# Patient Record
Sex: MALE | Race: WHITE | NOT HISPANIC OR LATINO | Employment: OTHER | URBAN - METROPOLITAN AREA
[De-identification: names, ages, dates, MRNs, and addresses within clinical notes are randomized per-mention and may not be internally consistent; named-entity substitution may affect disease eponyms.]

---

## 2023-09-23 PROBLEM — I10 ESSENTIAL HYPERTENSION: Status: ACTIVE | Noted: 2023-09-23

## 2023-09-23 PROBLEM — R01.1 MURMUR, CARDIAC: Status: ACTIVE | Noted: 2023-09-23

## 2023-09-23 PROBLEM — K21.00 REFLUX ESOPHAGITIS: Status: ACTIVE | Noted: 2023-09-23

## 2023-09-23 PROBLEM — E03.9 HYPOTHYROIDISM: Status: ACTIVE | Noted: 2023-09-23

## 2023-09-23 PROBLEM — F32.A DEPRESSION: Status: ACTIVE | Noted: 2023-09-23

## 2023-09-23 PROBLEM — I25.10 CORONARY ARTERY DISEASE: Status: ACTIVE | Noted: 2023-09-23

## 2023-09-23 PROBLEM — E87.5 HYPERKALEMIA: Status: ACTIVE | Noted: 2023-09-23

## 2023-09-23 PROBLEM — M53.9 MULTILEVEL DEGENERATIVE DISC DISEASE: Status: ACTIVE | Noted: 2023-09-23

## 2023-09-23 PROBLEM — G47.00 INSOMNIA: Status: ACTIVE | Noted: 2023-09-23

## 2023-09-23 PROBLEM — I35.0 AORTIC STENOSIS: Status: ACTIVE | Noted: 2023-09-23

## 2023-09-23 PROBLEM — M10.9 GOUT: Status: ACTIVE | Noted: 2023-09-23

## 2023-09-23 PROBLEM — R68.89 ABNORMAL CLINICAL FINDING: Status: ACTIVE | Noted: 2023-09-23

## 2023-09-23 PROBLEM — K92.2 GI BLEEDING: Status: ACTIVE | Noted: 2023-09-23

## 2023-09-23 PROBLEM — B35.1 ONYCHOMYCOSIS: Status: ACTIVE | Noted: 2023-09-23

## 2023-09-23 PROBLEM — R93.89 ABNORMAL CHEST XRAY: Status: ACTIVE | Noted: 2023-09-23

## 2023-09-23 PROBLEM — K63.89 MELANOSIS COLI: Status: ACTIVE | Noted: 2023-09-23

## 2023-09-23 PROBLEM — D64.9 ANEMIA: Status: ACTIVE | Noted: 2023-09-23

## 2023-09-23 PROBLEM — M54.30 SCIATICA: Status: ACTIVE | Noted: 2023-09-23

## 2023-09-23 PROBLEM — E61.1 IRON DEFICIENCY: Status: ACTIVE | Noted: 2023-09-23

## 2023-09-23 PROBLEM — F41.9 ANXIETY: Status: ACTIVE | Noted: 2023-09-23

## 2023-09-23 PROBLEM — K57.90 DIVERTICULOSIS: Status: ACTIVE | Noted: 2023-09-23

## 2023-09-23 PROBLEM — R73.9 HYPERGLYCEMIA: Status: ACTIVE | Noted: 2023-09-23

## 2023-09-23 PROBLEM — R09.89 CHEST CRACKLES: Status: ACTIVE | Noted: 2023-09-23

## 2023-09-23 PROBLEM — Z95.1 HISTORY OF CORONARY ARTERY BYPASS SURGERY: Status: ACTIVE | Noted: 2023-09-23

## 2023-09-23 PROBLEM — N17.9 ACUTE RENAL FAILURE (CMS-HCC): Status: ACTIVE | Noted: 2023-09-23

## 2023-09-23 PROBLEM — J84.9 INTERSTITIAL LUNG DISEASE (MULTI): Status: ACTIVE | Noted: 2023-09-23

## 2023-09-23 PROBLEM — N52.9 DIFFICULTY ATTAINING ERECTION: Status: ACTIVE | Noted: 2023-09-23

## 2023-09-23 PROBLEM — E78.5 HYPERLIPIDEMIA: Status: ACTIVE | Noted: 2023-09-23

## 2023-09-23 PROBLEM — J84.10 PULMONARY FIBROSIS (MULTI): Status: ACTIVE | Noted: 2023-09-23

## 2023-09-23 PROBLEM — M15.9 OSTEOARTHRITIS, MULTIPLE SITES: Status: ACTIVE | Noted: 2023-09-23

## 2023-09-23 PROBLEM — R60.9 EDEMA: Status: ACTIVE | Noted: 2023-09-23

## 2023-09-23 PROBLEM — R93.5 ABNORMAL CT OF THE ABDOMEN: Status: ACTIVE | Noted: 2023-09-23

## 2023-09-23 RX ORDER — AMLODIPINE BESYLATE 5 MG/1
1 TABLET ORAL DAILY
COMMUNITY
Start: 2022-09-27 | End: 2024-05-24 | Stop reason: WASHOUT

## 2023-09-23 RX ORDER — LEVOTHYROXINE SODIUM 88 UG/1
1 TABLET ORAL DAILY
COMMUNITY
Start: 2022-08-18 | End: 2024-04-10 | Stop reason: SDUPTHER

## 2023-09-23 RX ORDER — OMEPRAZOLE 40 MG/1
1 CAPSULE, DELAYED RELEASE ORAL 2 TIMES DAILY
COMMUNITY
Start: 2020-09-09 | End: 2024-05-15

## 2023-09-23 RX ORDER — ASCORBIC ACID 500 MG
500 TABLET,CHEWABLE ORAL SEE ADMIN INSTRUCTIONS
COMMUNITY

## 2023-09-23 RX ORDER — DOCUSATE SODIUM 100 MG/1
1 CAPSULE, LIQUID FILLED ORAL 2 TIMES DAILY
COMMUNITY
End: 2024-05-24 | Stop reason: WASHOUT

## 2023-09-23 RX ORDER — FERROUS SULFATE 325(65) MG
1 TABLET ORAL DAILY
COMMUNITY
Start: 2022-08-18 | End: 2024-01-05 | Stop reason: SDUPTHER

## 2023-09-23 RX ORDER — METOPROLOL TARTRATE 50 MG/1
1 TABLET ORAL 2 TIMES DAILY
COMMUNITY
Start: 2021-03-09 | End: 2023-10-27

## 2023-09-23 RX ORDER — ASPIRIN 81 MG/1
1 TABLET ORAL DAILY
COMMUNITY
Start: 2022-10-19

## 2023-09-23 RX ORDER — SERTRALINE HYDROCHLORIDE 50 MG/1
1 TABLET, FILM COATED ORAL DAILY
COMMUNITY
Start: 2021-03-09 | End: 2024-02-13 | Stop reason: SDUPTHER

## 2023-09-23 RX ORDER — CHOLECALCIFEROL (VITAMIN D3) 125 MCG
1 CAPSULE ORAL DAILY
COMMUNITY

## 2023-09-23 RX ORDER — TRAZODONE HYDROCHLORIDE 50 MG/1
1 TABLET ORAL NIGHTLY PRN
COMMUNITY
Start: 2021-03-09 | End: 2023-11-02 | Stop reason: SDUPTHER

## 2023-09-23 RX ORDER — DOXAZOSIN 1 MG/1
1 TABLET ORAL DAILY
COMMUNITY
Start: 2021-05-02 | End: 2024-01-05

## 2023-09-23 RX ORDER — TALC
1-2 POWDER (GRAM) TOPICAL NIGHTLY PRN
COMMUNITY

## 2023-09-23 RX ORDER — ROSUVASTATIN CALCIUM 40 MG/1
1 TABLET, COATED ORAL DAILY
COMMUNITY
Start: 2021-03-09 | End: 2023-10-27

## 2023-10-24 ENCOUNTER — APPOINTMENT (OUTPATIENT)
Dept: PRIMARY CARE | Facility: CLINIC | Age: 83
End: 2023-10-24
Payer: MEDICARE

## 2023-10-24 DIAGNOSIS — E78.5 HYPERLIPIDEMIA, UNSPECIFIED HYPERLIPIDEMIA TYPE: ICD-10-CM

## 2023-10-25 DIAGNOSIS — I10 PRIMARY HYPERTENSION: ICD-10-CM

## 2023-10-26 RX ORDER — TADALAFIL 20 MG/1
20 TABLET ORAL DAILY PRN
COMMUNITY
Start: 2023-09-17 | End: 2024-05-24 | Stop reason: WASHOUT

## 2023-10-27 RX ORDER — METOPROLOL TARTRATE 50 MG/1
50 TABLET ORAL 2 TIMES DAILY
Qty: 180 TABLET | Refills: 0 | Status: SHIPPED | OUTPATIENT
Start: 2023-10-27 | End: 2024-01-29 | Stop reason: SDUPTHER

## 2023-10-27 RX ORDER — ROSUVASTATIN CALCIUM 40 MG/1
40 TABLET, COATED ORAL DAILY
Qty: 90 TABLET | Refills: 0 | Status: SHIPPED | OUTPATIENT
Start: 2023-10-27 | End: 2024-01-29 | Stop reason: SDUPTHER

## 2023-11-02 DIAGNOSIS — G47.00 INSOMNIA, UNSPECIFIED TYPE: ICD-10-CM

## 2023-11-02 RX ORDER — TRAZODONE HYDROCHLORIDE 50 MG/1
50 TABLET ORAL NIGHTLY PRN
Qty: 180 TABLET | Refills: 2 | Status: SHIPPED | OUTPATIENT
Start: 2023-11-02

## 2023-11-08 ENCOUNTER — APPOINTMENT (OUTPATIENT)
Dept: PRIMARY CARE | Facility: CLINIC | Age: 83
End: 2023-11-08
Payer: MEDICARE

## 2023-12-07 ENCOUNTER — APPOINTMENT (OUTPATIENT)
Dept: PRIMARY CARE | Facility: CLINIC | Age: 83
End: 2023-12-07
Payer: MEDICARE

## 2024-01-04 DIAGNOSIS — N40.0 BENIGN PROSTATIC HYPERPLASIA, UNSPECIFIED WHETHER LOWER URINARY TRACT SYMPTOMS PRESENT: Primary | ICD-10-CM

## 2024-01-05 ENCOUNTER — TELEMEDICINE (OUTPATIENT)
Dept: PRIMARY CARE | Facility: CLINIC | Age: 84
End: 2024-01-05
Payer: MEDICARE

## 2024-01-05 VITALS
SYSTOLIC BLOOD PRESSURE: 155 MMHG | TEMPERATURE: 96.7 F | OXYGEN SATURATION: 96 % | HEART RATE: 101 BPM | DIASTOLIC BLOOD PRESSURE: 85 MMHG

## 2024-01-05 DIAGNOSIS — D50.9 IRON DEFICIENCY ANEMIA, UNSPECIFIED IRON DEFICIENCY ANEMIA TYPE: Primary | ICD-10-CM

## 2024-01-05 DIAGNOSIS — J40 BRONCHITIS: ICD-10-CM

## 2024-01-05 DIAGNOSIS — I10 ESSENTIAL HYPERTENSION: ICD-10-CM

## 2024-01-05 PROBLEM — R01.1 MURMUR, CARDIAC: Status: RESOLVED | Noted: 2023-09-23 | Resolved: 2024-01-05

## 2024-01-05 PROCEDURE — 99442 PR PHYS/QHP TELEPHONE EVALUATION 11-20 MIN: CPT | Performed by: INTERNAL MEDICINE

## 2024-01-05 RX ORDER — DOXAZOSIN 1 MG/1
1 TABLET ORAL DAILY
Qty: 90 TABLET | Refills: 3 | Status: SHIPPED | OUTPATIENT
Start: 2024-01-05

## 2024-01-05 RX ORDER — FERROUS SULFATE 325(65) MG
1 TABLET ORAL
Qty: 90 TABLET | Refills: 3 | Status: SHIPPED | OUTPATIENT
Start: 2024-01-05

## 2024-01-05 RX ORDER — AZITHROMYCIN 500 MG/1
500 TABLET, FILM COATED ORAL DAILY
Qty: 7 TABLET | Refills: 0 | Status: SHIPPED | OUTPATIENT
Start: 2024-01-05 | End: 2024-01-12

## 2024-01-05 RX ORDER — BENZONATATE 200 MG/1
200 CAPSULE ORAL 3 TIMES DAILY PRN
Qty: 30 CAPSULE | Refills: 0 | Status: SHIPPED | OUTPATIENT
Start: 2024-01-05 | End: 2024-05-24 | Stop reason: WASHOUT

## 2024-01-05 RX ORDER — FLUTICASONE PROPIONATE 50 MCG
2 SPRAY, SUSPENSION (ML) NASAL DAILY
COMMUNITY
End: 2024-05-24 | Stop reason: WASHOUT

## 2024-01-05 RX ORDER — DOXAZOSIN 1 MG/1
1 TABLET ORAL NIGHTLY
Qty: 90 TABLET | Refills: 3 | Status: SHIPPED | OUTPATIENT
Start: 2024-01-05 | End: 2024-05-24 | Stop reason: SDUPTHER

## 2024-01-05 ASSESSMENT — PAIN SCALES - GENERAL: PAINLEVEL: 0-NO PAIN

## 2024-01-05 ASSESSMENT — PATIENT HEALTH QUESTIONNAIRE - PHQ9
2. FEELING DOWN, DEPRESSED OR HOPELESS: NOT AT ALL
SUM OF ALL RESPONSES TO PHQ9 QUESTIONS 1 & 2: 0
1. LITTLE INTEREST OR PLEASURE IN DOING THINGS: NOT AT ALL

## 2024-01-05 NOTE — PROGRESS NOTES
Chief Complaint   Patient presents with    Headache    Cough    Fatigue    Generalized Body Aches     Pt presents for telephone visit with c/o sinus pressure, nasal drainage, moist cough that is productive with yellowish-brown phlegm, headache and body aches.  Onset 1.5 weeks.         83 year old man   he became ill few days after Nohemy.   +contact with someone with recent flu-that person had been to [place].   Cough productive. No fever when checked temperature.   Sometimes felt like a fever.  No wheezing but had it before.   When he gets up in am will have wheeze and cough.   Did not have it this morning. Does not feel shortness of breath. No nausea, vomiting, or diarrhea.       PAST MEDICAL HISTORY:   1. CABG 2010.  2. Hypothyroidism, TSH 4/2023 normal  3. Hyperglycemia.  4. Anemia, admission 2014.  5. GERD, hiatal hernia, esophageal stricture, status post dilatation.  6. Gastric ulcer and duodenitis.  7. Hyperlipidemia.  8. Gout.  9. Diverticulosis.  10. Depression and anxiety.  11. Insomnia.  12. Erectile dysfunction.  13. Prior leukopenia.  14. Thrombocytopenia in 2016, previously evaluated by Dr. Moralez.  15. LEFT 3rd toe chronic ulcer. Dr Dominguez. Healed 2019  16. Cystitis, ARF, sepsis. possible bladder mass. 7/2022.     PHYSICAL EXAM:   speech intact        4/2023 K, cbc, cmp tsh  K 4.8 repat 4.8 (post kayexolate)   hg 12 hct 36.6 cr 2.4 Na 140        LABS 9 December 2022 A1c, CBC, iron, ferritin, CMP, lipid  A1c 5.1  White blood cell count 5.7 hemoglobin low 11.7 hematocrit 35.2 platelet 162 iron 70 ferritin 190 normal  Potassium 5.2 elevated. Creatinine 2.0 BUN 41 glucose elevated 109 liver function test negative  Cholesterol 111 HDL low 36 LDL 42 triglyceride 165     8/24/22 bmp  Na 141 K 4.7 Cr 2.2 Glucose 117 GFR 28     8/2022 A1c, BNP, liid, cmp, tsh, cbc  A1c 4.3 wbc 6.9 hg 10.2 hct 31.4  BNP elevated 493 (-100)  167/81/58/138  Na 141 Cr 2.2 K elevated 5.6-- corrected. GFR 29  TSH elevated 4.15      2/2022 lipid, bmp, ferritin, iron, tsh, cbc.   Cholesterol 116 LDL 53  Hemoglobin low 12.5 (history of anemia)  Creatinine 1.4 stable glucose elevated 115  Ferritin 87 iron 46  TSH elevated 6.93      9/2021 CMP, A1c, CBC  Na 137 K 4.9 Cr elevated 1.4 glucose 100 liver function tests negative  A1c 5.2  wbc 5.4 hg low 11.8 hct 35.8 platelet 181     Reviewed CT chest 2022 DB motion     Assessment and plan     Sherman was seen today for headache, cough, fatigue and generalized body aches.  Diagnoses and all orders for this visit:  Iron deficiency anemia, unspecified iron deficiency anemia type (Primary)  -     ferrous sulfate, 325 mg ferrous sulfate, tablet; Take 1 tablet by mouth once daily with breakfast. Take with otc vitamin c  Essential hypertension  -     doxazosin (Cardura) 1 mg tablet; Take 1 tablet (1 mg) by mouth once daily at bedtime.  Bronchitis  -     azithromycin (Zithromax) 500 mg tablet; Take 1 tablet (500 mg) by mouth once daily for 7 days.  -     benzonatate (Tessalon) 200 mg capsule; Take 1 capsule (200 mg) by mouth 3 times a day as needed for cough. Do not crush or chew.          colonoscopy 2015 Dr Naylor

## 2024-01-08 ENCOUNTER — APPOINTMENT (OUTPATIENT)
Dept: PRIMARY CARE | Facility: CLINIC | Age: 84
End: 2024-01-08
Payer: MEDICARE

## 2024-01-29 DIAGNOSIS — E78.5 HYPERLIPIDEMIA, UNSPECIFIED HYPERLIPIDEMIA TYPE: ICD-10-CM

## 2024-01-29 DIAGNOSIS — I10 PRIMARY HYPERTENSION: ICD-10-CM

## 2024-01-29 RX ORDER — ROSUVASTATIN CALCIUM 40 MG/1
40 TABLET, COATED ORAL DAILY
Qty: 90 TABLET | Refills: 0 | Status: SHIPPED | OUTPATIENT
Start: 2024-01-29 | End: 2024-04-29

## 2024-01-29 RX ORDER — METOPROLOL TARTRATE 50 MG/1
50 TABLET ORAL 2 TIMES DAILY
Qty: 180 TABLET | Refills: 0 | Status: SHIPPED | OUTPATIENT
Start: 2024-01-29 | End: 2024-04-29

## 2024-02-13 DIAGNOSIS — F41.9 ANXIETY: ICD-10-CM

## 2024-02-13 DIAGNOSIS — F32.A DEPRESSION, UNSPECIFIED DEPRESSION TYPE: ICD-10-CM

## 2024-02-13 RX ORDER — SERTRALINE HYDROCHLORIDE 50 MG/1
50 TABLET, FILM COATED ORAL DAILY
Qty: 90 TABLET | Refills: 1 | Status: SHIPPED | OUTPATIENT
Start: 2024-02-13

## 2024-04-10 DIAGNOSIS — E78.5 HYPERLIPIDEMIA, UNSPECIFIED HYPERLIPIDEMIA TYPE: ICD-10-CM

## 2024-04-10 DIAGNOSIS — E03.9 HYPOTHYROIDISM, UNSPECIFIED TYPE: ICD-10-CM

## 2024-04-10 DIAGNOSIS — I10 ESSENTIAL HYPERTENSION: ICD-10-CM

## 2024-04-10 DIAGNOSIS — R73.9 HYPERGLYCEMIA: ICD-10-CM

## 2024-04-10 DIAGNOSIS — E03.9 HYPOTHYROIDISM, UNSPECIFIED TYPE: Primary | ICD-10-CM

## 2024-04-10 RX ORDER — LEVOTHYROXINE SODIUM 88 UG/1
88 TABLET ORAL DAILY
Qty: 90 TABLET | Refills: 0 | Status: SHIPPED | OUTPATIENT
Start: 2024-04-10

## 2024-04-11 ENCOUNTER — TELEPHONE (OUTPATIENT)
Dept: PRIMARY CARE | Facility: CLINIC | Age: 84
End: 2024-04-11
Payer: MEDICARE

## 2024-04-11 NOTE — TELEPHONE ENCOUNTER
----- Message from Tianna Balbuena MD sent at 4/10/2024  5:03 PM EDT -----  Rx sent. Due for lab work. Orders in system for  lab. Please have pt schedule f/up. Not urgent.

## 2024-04-11 NOTE — TELEPHONE ENCOUNTER
Message sent via My chart.  Called pt and left message to return call to clinical with any questions.

## 2024-04-28 DIAGNOSIS — I10 PRIMARY HYPERTENSION: ICD-10-CM

## 2024-04-28 DIAGNOSIS — E78.5 HYPERLIPIDEMIA, UNSPECIFIED HYPERLIPIDEMIA TYPE: ICD-10-CM

## 2024-04-29 RX ORDER — ROSUVASTATIN CALCIUM 40 MG/1
40 TABLET, COATED ORAL DAILY
Qty: 90 TABLET | Refills: 0 | Status: SHIPPED | OUTPATIENT
Start: 2024-04-29

## 2024-04-29 RX ORDER — METOPROLOL TARTRATE 50 MG/1
50 TABLET ORAL 2 TIMES DAILY
Qty: 180 TABLET | Refills: 0 | Status: SHIPPED | OUTPATIENT
Start: 2024-04-29

## 2024-04-30 LAB
NON-UH HIE A/G RATIO: 1
NON-UH HIE ALB: 3.7 G/DL (ref 3.4–5)
NON-UH HIE ALK PHOS: 83 UNIT/L (ref 45–117)
NON-UH HIE BILIRUBIN, TOTAL: 0.4 MG/DL (ref 0.3–1.2)
NON-UH HIE BUN/CREAT RATIO: 15.2
NON-UH HIE BUN: 38 MG/DL (ref 9–23)
NON-UH HIE CALCIUM: 10.7 MG/DL (ref 8.7–10.4)
NON-UH HIE CALCULATED LDL CHOLESTEROL: 70 MG/DL (ref 60–130)
NON-UH HIE CALCULATED OSMOLALITY: 289 MOSM/KG (ref 275–295)
NON-UH HIE CHLORIDE: 108 MMOL/L (ref 98–107)
NON-UH HIE CHOLESTEROL: 149 MG/DL (ref 100–200)
NON-UH HIE CO2, VENOUS: 24 MMOL/L (ref 20–31)
NON-UH HIE CREATININE: 2.5 MG/DL (ref 0.6–1.1)
NON-UH HIE GFR AA: 30
NON-UH HIE GLOBULIN: 3.7 G/DL
NON-UH HIE GLOMERULAR FILTRATION RATE: 25 ML/MIN/1.73M?
NON-UH HIE GLUCOSE: 109 MG/DL (ref 74–106)
NON-UH HIE GOT: 27 UNIT/L (ref 15–37)
NON-UH HIE GPT: 22 UNIT/L (ref 10–49)
NON-UH HIE HCT: 38.9 % (ref 41–52)
NON-UH HIE HDL CHOLESTEROL: 37 MG/DL (ref 40–60)
NON-UH HIE HGB A1C: 5.2 %
NON-UH HIE HGB: 13.2 G/DL (ref 13.5–17.5)
NON-UH HIE INSTR WBC ND: 5.5
NON-UH HIE K: 5.2 MMOL/L (ref 3.5–5.1)
NON-UH HIE MCH: 33 PG (ref 27–34)
NON-UH HIE MCHC: 33.8 G/DL (ref 32–37)
NON-UH HIE MCV: 97.7 FL (ref 80–100)
NON-UH HIE MPV: 8.3 FL (ref 7.4–10.4)
NON-UH HIE NA: 140 MMOL/L (ref 135–145)
NON-UH HIE PLATELET: 157 X10 (ref 150–450)
NON-UH HIE RBC: 3.98 X10 (ref 4.7–6.1)
NON-UH HIE RDW: 13.3 % (ref 11.5–14.5)
NON-UH HIE TOTAL CHOL/HDL CHOL RATIO: 4
NON-UH HIE TOTAL PROTEIN: 7.4 G/DL (ref 5.7–8.2)
NON-UH HIE TRIGLYCERIDES: 211 MG/DL (ref 30–150)
NON-UH HIE TSH: 1.82 UIU/ML (ref 0.55–4.78)
NON-UH HIE WBC: 5.5 X10 (ref 4.5–11)

## 2024-05-09 ENCOUNTER — APPOINTMENT (OUTPATIENT)
Dept: PRIMARY CARE | Facility: CLINIC | Age: 84
End: 2024-05-09
Payer: MEDICARE

## 2024-05-15 DIAGNOSIS — K92.2 GASTROINTESTINAL HEMORRHAGE, UNSPECIFIED GASTROINTESTINAL HEMORRHAGE TYPE: Primary | ICD-10-CM

## 2024-05-15 RX ORDER — OMEPRAZOLE 40 MG/1
CAPSULE, DELAYED RELEASE ORAL
Qty: 180 CAPSULE | Refills: 3 | Status: SHIPPED | OUTPATIENT
Start: 2024-05-15

## 2024-05-16 ENCOUNTER — APPOINTMENT (OUTPATIENT)
Dept: PRIMARY CARE | Facility: CLINIC | Age: 84
End: 2024-05-16
Payer: MEDICARE

## 2024-05-16 NOTE — PROGRESS NOTES
No chief complaint on file.      83 year old man  Last 01/2024.   .       PAST MEDICAL HISTORY:   1. CABG 2010.  2. Hypothyroidism, TSH 4/2023 normal  3. Hyperglycemia.  4. Anemia, admission 2014.  5. GERD, hiatal hernia, esophageal stricture, status post dilatation.  6. Gastric ulcer and duodenitis.  7. Hyperlipidemia.  8. Gout.  9. Diverticulosis.  10. Depression and anxiety.  11. Insomnia.  12. Erectile dysfunction.  13. Prior leukopenia.  14. Thrombocytopenia in 2016, previously evaluated by Dr. Moralez.  15. LEFT 3rd toe chronic ulcer. Dr Dominguez. Healed 2019  16. Cystitis, ARF, sepsis. possible bladder mass. 7/2022.     PHYSICAL EXAM:   There were no vitals taken for this visit.  There is no height or weight on file to calculate BMI.        labs 4/2024 A1c, lipid, cmp, tsh, cbc  A1c. 5.2    4/2023 K, cbc, cmp tsh  K 4.8 repat 4.8 (post kayexolate)   hg 12 hct 36.6 cr 2.4 Na 140        LABS 9 December 2022 A1c, CBC, iron, ferritin, CMP, lipid  A1c 5.1  White blood cell count 5.7 hemoglobin low 11.7 hematocrit 35.2 platelet 162 iron 70 ferritin 190 normal  Potassium 5.2 elevated. Creatinine 2.0 BUN 41 glucose elevated 109 liver function test negative  Cholesterol 111 HDL low 36 LDL 42 triglyceride 165     8/24/22 bmp  Na 141 K 4.7 Cr 2.2 Glucose 117 GFR 28     8/2022 A1c, BNP, liid, cmp, tsh, cbc  A1c 4.3 wbc 6.9 hg 10.2 hct 31.4  BNP elevated 493 (-100)  167/81/58/138  Na 141 Cr 2.2 K elevated 5.6-- corrected. GFR 29  TSH elevated 4.15     2/2022 lipid, bmp, ferritin, iron, tsh, cbc.   Cholesterol 116 LDL 53  Hemoglobin low 12.5 (history of anemia)  Creatinine 1.4 stable glucose elevated 115  Ferritin 87 iron 46  TSH elevated 6.93      9/2021 CMP, A1c, CBC  Na 137 K 4.9 Cr elevated 1.4 glucose 100 liver function tests negative  A1c 5.2  wbc 5.4 hg low 11.8 hct 35.8 platelet 181     Reviewed CT chest 2022 DB motion     Assessment and plan     There are no diagnoses linked to this encounter.          colonoscopy 2015  Dr Naylor     Current Outpatient Medications on File Prior to Visit   Medication Sig Dispense Refill    amLODIPine (Norvasc) 5 mg tablet Take 1 tablet (5 mg) by mouth once daily.      ascorbic acid (Strawberry C) 500 mg chewable tablet Chew 1 tablet (500 mg) see administration instructions. Take as directed      aspirin 81 mg EC tablet Take 1 tablet (81 mg) by mouth once daily.      BACILLUS SUBTILIS-INULIN ORAL Take 1 tablet by mouth once daily.      benzonatate (Tessalon) 200 mg capsule Take 1 capsule (200 mg) by mouth 3 times a day as needed for cough. Do not crush or chew. 30 capsule 0    cholecalciferol (Vitamin D-3) 125 MCG (5000 UT) capsule Take 1 capsule (125 mcg) by mouth once daily.      docusate sodium (Colace) 100 mg capsule Take 1 capsule (100 mg) by mouth 2 times a day.      doxazosin (Cardura) 1 mg tablet TAKE 1 TABLET BY MOUTH DAILY 90 tablet 3    doxazosin (Cardura) 1 mg tablet Take 1 tablet (1 mg) by mouth once daily at bedtime. 90 tablet 3    ferrous sulfate, 325 mg ferrous sulfate, tablet Take 1 tablet by mouth once daily with breakfast. Take with otc vitamin c 90 tablet 3    fluticasone (Flonase) 50 mcg/actuation nasal spray Administer 2 sprays into each nostril once daily.      levothyroxine (Synthroid, Levoxyl) 88 mcg tablet Take 1 tablet (88 mcg) by mouth once daily. 90 tablet 0    melatonin 3 mg tablet Take 1-2 tablets (3-6 mg) by mouth as needed at bedtime.      metoprolol tartrate (Lopressor) 50 mg tablet TAKE 1 TABLET BY MOUTH TWICE DAILY 180 tablet 0    multivit-min/ferrous fumarate (MULTI VITAMIN ORAL) Take 1 tablet by mouth once daily.      multivit-min/ferrous fumarate (MULTI VITAMIN ORAL) Take 1 tablet by mouth once daily.      omeprazole (PriLOSEC) 40 mg DR capsule TAKE 1 CAPSULE BY MOUTH EVERY DAY IN THE MORNING AND 60 MINUTES BEFORE A MEAL 180 capsule 3    rosuvastatin (Crestor) 40 mg tablet TAKE 1 TABLET(40 MG) BY MOUTH EVERY DAY 90 tablet 0    sertraline (Zoloft) 50 mg tablet  Take 1 tablet (50 mg) by mouth once daily. 90 tablet 1    tadalafil 20 mg tablet Take 1 tablet (20 mg) by mouth once daily as needed.      traZODone (Desyrel) 50 mg tablet Take 1 tablet (50 mg) by mouth as needed at bedtime for sleep. May increase to 2 tablets at bedtime if needed 180 tablet 2    [DISCONTINUED] omeprazole (PriLOSEC) 40 mg DR capsule Take 1 capsule (40 mg) by mouth 2 times a day. Every day in the morning and 60 minutes before a meal       No current facility-administered medications on file prior to visit.

## 2024-05-22 PROBLEM — H26.9 CATARACT: Status: ACTIVE | Noted: 2024-05-22

## 2024-05-22 PROBLEM — J84.09: Status: ACTIVE | Noted: 2024-05-22

## 2024-05-22 PROBLEM — E66.3 OVERWEIGHT: Status: ACTIVE | Noted: 2024-05-22

## 2024-05-22 PROBLEM — I10 HYPERTENSION: Status: ACTIVE | Noted: 2022-09-28

## 2024-05-22 PROBLEM — D69.6 LOW PLATELET COUNT (CMS-HCC): Status: ACTIVE | Noted: 2024-05-22

## 2024-05-22 PROBLEM — N18.9 CHRONIC KIDNEY DISEASE: Status: ACTIVE | Noted: 2024-05-22

## 2024-05-22 PROBLEM — K21.9 GASTROESOPHAGEAL REFLUX DISEASE: Status: ACTIVE | Noted: 2024-05-22

## 2024-05-22 PROBLEM — N28.9 ACUTE RENAL INSUFFICIENCY: Status: ACTIVE | Noted: 2023-09-23

## 2024-05-22 PROBLEM — I25.10 CORONARY ARTERIOSCLEROSIS: Status: ACTIVE | Noted: 2022-11-16

## 2024-05-22 PROBLEM — D72.819 LEUKOPENIA: Status: ACTIVE | Noted: 2024-05-22

## 2024-05-22 PROBLEM — M19.90 ARTHRITIS: Status: ACTIVE | Noted: 2024-05-22

## 2024-05-23 PROBLEM — K21.9 GASTROESOPHAGEAL REFLUX DISEASE: Status: RESOLVED | Noted: 2024-05-22 | Resolved: 2024-05-23

## 2024-05-23 PROBLEM — R68.89 ABNORMAL CLINICAL FINDING: Status: RESOLVED | Noted: 2023-09-23 | Resolved: 2024-05-23

## 2024-05-23 PROBLEM — R93.89 STANDARD CHEST X-RAY ABNORMAL: Status: RESOLVED | Noted: 2023-09-23 | Resolved: 2024-05-23

## 2024-05-24 ENCOUNTER — OFFICE VISIT (OUTPATIENT)
Dept: PRIMARY CARE | Facility: CLINIC | Age: 84
End: 2024-05-24
Payer: MEDICARE

## 2024-05-24 VITALS
SYSTOLIC BLOOD PRESSURE: 168 MMHG | OXYGEN SATURATION: 98 % | TEMPERATURE: 98 F | DIASTOLIC BLOOD PRESSURE: 88 MMHG | HEIGHT: 68 IN | WEIGHT: 198.6 LBS | HEART RATE: 53 BPM | BODY MASS INDEX: 30.1 KG/M2

## 2024-05-24 DIAGNOSIS — R09.89 RESPIRATORY CRACKLES: ICD-10-CM

## 2024-05-24 DIAGNOSIS — E03.9 HYPOTHYROIDISM, UNSPECIFIED TYPE: ICD-10-CM

## 2024-05-24 DIAGNOSIS — I10 HYPERTENSION, UNSPECIFIED TYPE: Primary | ICD-10-CM

## 2024-05-24 DIAGNOSIS — N18.9 CHRONIC KIDNEY DISEASE, UNSPECIFIED CKD STAGE: ICD-10-CM

## 2024-05-24 PROCEDURE — 1159F MED LIST DOCD IN RCRD: CPT | Performed by: INTERNAL MEDICINE

## 2024-05-24 PROCEDURE — 1036F TOBACCO NON-USER: CPT | Performed by: INTERNAL MEDICINE

## 2024-05-24 PROCEDURE — 3079F DIAST BP 80-89 MM HG: CPT | Performed by: INTERNAL MEDICINE

## 2024-05-24 PROCEDURE — 99214 OFFICE O/P EST MOD 30 MIN: CPT | Performed by: INTERNAL MEDICINE

## 2024-05-24 PROCEDURE — 1126F AMNT PAIN NOTED NONE PRSNT: CPT | Performed by: INTERNAL MEDICINE

## 2024-05-24 PROCEDURE — 3077F SYST BP >= 140 MM HG: CPT | Performed by: INTERNAL MEDICINE

## 2024-05-24 RX ORDER — AMLODIPINE BESYLATE 5 MG/1
5 TABLET ORAL DAILY
Qty: 90 TABLET | Refills: 3 | Status: SHIPPED | OUTPATIENT
Start: 2024-05-24

## 2024-05-24 ASSESSMENT — LIFESTYLE VARIABLES
AUDIT-C TOTAL SCORE: 1
HOW MANY STANDARD DRINKS CONTAINING ALCOHOL DO YOU HAVE ON A TYPICAL DAY: 1 OR 2
HOW OFTEN DO YOU HAVE A DRINK CONTAINING ALCOHOL: MONTHLY OR LESS
SKIP TO QUESTIONS 9-10: 1
HOW OFTEN DO YOU HAVE SIX OR MORE DRINKS ON ONE OCCASION: NEVER

## 2024-05-24 ASSESSMENT — PATIENT HEALTH QUESTIONNAIRE - PHQ9
SUM OF ALL RESPONSES TO PHQ9 QUESTIONS 1 & 2: 0
1. LITTLE INTEREST OR PLEASURE IN DOING THINGS: NOT AT ALL
2. FEELING DOWN, DEPRESSED OR HOPELESS: NOT AT ALL

## 2024-05-24 ASSESSMENT — PAIN SCALES - GENERAL: PAINLEVEL: 0-NO PAIN

## 2024-05-24 NOTE — PROGRESS NOTES
"Chief Complaint   Patient presents with    Follow-up    Results     Pt here for 4 mo FUV and to review results.       83 year old man   last visit 01/2024. Accompanied by his wife. Doing well. No cardiopulmonary complaints.   Saw Dr Garcia-had stopped amlodipine 5 but at last visit was thinking of restarting. Checking bp at home and sent list to Dr Garcia.   Brought in copy today.   On exam noted to have crackles. They indicate this is chronic.    PAST MEDICAL HISTORY:   1. CABG 2010.  2. Hypothyroidism, TSH 4/2024 normal  3. Hyperglycemia.  4. Anemia, admission 2014.  5. GERD, hiatal hernia, esophageal stricture, status post dilatation.  6. Gastric ulcer and duodenitis.  7. Hyperlipidemia.  8. Gout.  9. Diverticulosis.  10. Depression and anxiety.  11. Insomnia.  12. Erectile dysfunction.  13. Prior leukopenia.  14. Thrombocytopenia in 2016, previously evaluated by Dr. Moralez.  15. LEFT 3rd toe chronic ulcer. Dr Dominguez. Healed 2019  16. Cystitis, ARF, sepsis. possible bladder mass. 7/2022.     PHYSICAL EXAM:   Blood pressure 168/88, pulse 53, temperature 36.7 °C (98 °F), height 1.727 m (5' 8\"), weight 90.1 kg (198 lb 9.6 oz), SpO2 98%.  Body mass index is 30.2 kg/m².  Chest well healed surgical scar     Vital reviewed  Neck: no cervical/clavicular adenopathy  CV: RRR S1 S2 normal. No murmur. No carotid bruit.   Lungs: +dry crackles. . Breath sounds symmetric  Abdomen: normoactive. Soft, nontender. No mass.  Extremities: no pretibial edema  Neuro: speech intact.      4/2024  A1c, lipid, tsh, cmp, cbc.  A1c 5.2  149/37/70/211       TSH 1.82 normal        K 5.2 calcium elevated 10.7 cr 2.5 glucose 109       Wbc 5.5 hg 13.2 platelet 157    4/2023 K, cbc, cmp tsh  K 4.8 repeat 4.8 (post kayexolate)   hg 12 hct 36.6 cr 2.4 Na 140        12/2022 A1c, CBC, iron, ferritin, CMP, lipid  A1c 5.1  White blood cell count 5.7 hemoglobin low 11.7 hematocrit 35.2 platelet 162 iron 70 ferritin 190 normal  Potassium 5.2 elevated. " Creatinine 2.0 BUN 41 glucose elevated 109 liver function test negative  Cholesterol 111 HDL low 36 LDL 42 triglyceride 165     8/24/22 bmp  Na 141 K 4.7 Cr 2.2 Glucose 117 GFR 28     8/2022 A1c, BNP, liid, cmp, tsh, cbc  A1c 4.3 wbc 6.9 hg 10.2 hct 31.4  BNP elevated 493 (-100)  167/81/58/138  Na 141 Cr 2.2 K elevated 5.6-- corrected. GFR 29  TSH elevated 4.15     2/2022 lipid, bmp, ferritin, iron, tsh, cbc.   Cholesterol 116 LDL 53  Hemoglobin low 12.5 (history of anemia)  Creatinine 1.4 stable glucose elevated 115  Ferritin 87 iron 46  TSH elevated 6.93      9/2021 CMP, A1c, CBC  Na 137 K 4.9 Cr elevated 1.4 glucose 100 liver function tests negative  A1c 5.2  wbc 5.4 hg low 11.8 hct 35.8 platelet 181     Reviewed CT chest 2022 DB motion     Assessment and plan  1. Hypertension, unspecified type  Not at goal. Asymptomatic. Restart amlodipine 5 mg  They will let Dr Garcia know.   Has orders for labs in November from Dr Garcia for renal panel., MA, D, cbc, ferritin, and iron  F/up here 6 months and prn.   - amLODIPine (Norvasc) 5 mg tablet; Take 1 tablet (5 mg) by mouth once daily.  Dispense: 90 tablet; Refill: 3    2. Respiratory crackles  Chronic. Denies any respiratory complaints.     3. Hypothyroidism, unspecified type  Recent tsh within goal.    4. Chronic kidney disease, unspecified CKD stage  Up to date with seeing nephrologist.     colonoscopy 2015 Dr Naylor     Current Outpatient Medications on File Prior to Visit   Medication Sig Dispense Refill    ascorbic acid (Strawberry C) 500 mg chewable tablet Chew 1 tablet (500 mg) see administration instructions. Take as directed      aspirin 81 mg EC tablet Take 1 tablet (81 mg) by mouth once daily.      BACILLUS SUBTILIS-INULIN ORAL Take 1 tablet by mouth once daily.      cholecalciferol (Vitamin D-3) 125 MCG (5000 UT) capsule Take 1 capsule (125 mcg) by mouth once daily.      diphenhydrAMINE-acetaminophen (Tylenol PM)  mg per tablet Take 1 tablet by mouth  as needed at bedtime for sleep.      doxazosin (Cardura) 1 mg tablet TAKE 1 TABLET BY MOUTH DAILY 90 tablet 3    ferrous sulfate, 325 mg ferrous sulfate, tablet Take 1 tablet by mouth once daily with breakfast. Take with otc vitamin c 90 tablet 3    levothyroxine (Synthroid, Levoxyl) 88 mcg tablet Take 1 tablet (88 mcg) by mouth once daily. 90 tablet 0    melatonin 3 mg tablet Take 1-2 tablets (3-6 mg) by mouth as needed at bedtime.      metoprolol tartrate (Lopressor) 50 mg tablet TAKE 1 TABLET BY MOUTH TWICE DAILY 180 tablet 0    multivit-min/ferrous fumarate (MULTI VITAMIN ORAL) Take 1 tablet by mouth once daily.      omeprazole (PriLOSEC) 40 mg DR capsule TAKE 1 CAPSULE BY MOUTH EVERY DAY IN THE MORNING AND 60 MINUTES BEFORE A MEAL 180 capsule 3    rosuvastatin (Crestor) 40 mg tablet TAKE 1 TABLET(40 MG) BY MOUTH EVERY DAY 90 tablet 0    sertraline (Zoloft) 50 mg tablet Take 1 tablet (50 mg) by mouth once daily. 90 tablet 1    traZODone (Desyrel) 50 mg tablet Take 1 tablet (50 mg) by mouth as needed at bedtime for sleep. May increase to 2 tablets at bedtime if needed 180 tablet 2    [DISCONTINUED] amLODIPine (Norvasc) 5 mg tablet Take 1 tablet (5 mg) by mouth once daily.      [DISCONTINUED] benzonatate (Tessalon) 200 mg capsule Take 1 capsule (200 mg) by mouth 3 times a day as needed for cough. Do not crush or chew. (Patient not taking: Reported on 5/24/2024) 30 capsule 0    [DISCONTINUED] docusate sodium (Colace) 100 mg capsule Take 1 capsule (100 mg) by mouth 2 times a day.      [DISCONTINUED] doxazosin (Cardura) 1 mg tablet Take 1 tablet (1 mg) by mouth once daily at bedtime. 90 tablet 3    [DISCONTINUED] fluticasone (Flonase) 50 mcg/actuation nasal spray Administer 2 sprays into each nostril once daily.      [DISCONTINUED] multivit-min/ferrous fumarate (MULTI VITAMIN ORAL) Take 1 tablet by mouth once daily.      [DISCONTINUED] tadalafil 20 mg tablet Take 1 tablet (20 mg) by mouth once daily as needed.        No current facility-administered medications on file prior to visit.

## 2024-07-18 DIAGNOSIS — E03.9 HYPOTHYROIDISM, UNSPECIFIED TYPE: ICD-10-CM

## 2024-07-18 RX ORDER — LEVOTHYROXINE SODIUM 88 UG/1
88 TABLET ORAL DAILY
Qty: 90 TABLET | Refills: 3 | Status: SHIPPED | OUTPATIENT
Start: 2024-07-18

## 2024-07-25 ENCOUNTER — TELEPHONE (OUTPATIENT)
Dept: PRIMARY CARE | Facility: CLINIC | Age: 84
End: 2024-07-25
Payer: MEDICARE

## 2024-07-25 DIAGNOSIS — N52.9 ERECTILE DYSFUNCTION, UNSPECIFIED ERECTILE DYSFUNCTION TYPE: Primary | ICD-10-CM

## 2024-07-25 RX ORDER — TADALAFIL 20 MG/1
20 TABLET ORAL DAILY PRN
Qty: 12 TABLET | Refills: 3 | Status: SHIPPED | OUTPATIENT
Start: 2024-07-25 | End: 2025-07-25

## 2024-07-27 DIAGNOSIS — I10 PRIMARY HYPERTENSION: ICD-10-CM

## 2024-07-27 DIAGNOSIS — E78.5 HYPERLIPIDEMIA, UNSPECIFIED HYPERLIPIDEMIA TYPE: ICD-10-CM

## 2024-07-29 RX ORDER — METOPROLOL TARTRATE 50 MG/1
50 TABLET ORAL 2 TIMES DAILY
Qty: 180 TABLET | Refills: 3 | Status: SHIPPED | OUTPATIENT
Start: 2024-07-29

## 2024-07-29 RX ORDER — ROSUVASTATIN CALCIUM 40 MG/1
40 TABLET, COATED ORAL DAILY
Qty: 90 TABLET | Refills: 3 | Status: SHIPPED | OUTPATIENT
Start: 2024-07-29

## 2024-08-10 DIAGNOSIS — F32.A DEPRESSION, UNSPECIFIED DEPRESSION TYPE: ICD-10-CM

## 2024-08-10 DIAGNOSIS — F41.9 ANXIETY: ICD-10-CM

## 2024-08-12 RX ORDER — SERTRALINE HYDROCHLORIDE 50 MG/1
50 TABLET, FILM COATED ORAL DAILY
Qty: 90 TABLET | Refills: 3 | Status: SHIPPED | OUTPATIENT
Start: 2024-08-12

## 2024-10-24 DIAGNOSIS — I10 HYPERTENSION, UNSPECIFIED TYPE: Primary | ICD-10-CM

## 2024-10-24 DIAGNOSIS — G47.00 INSOMNIA, UNSPECIFIED TYPE: ICD-10-CM

## 2024-10-24 RX ORDER — TRAZODONE HYDROCHLORIDE 50 MG/1
50 TABLET ORAL NIGHTLY PRN
Qty: 180 TABLET | Refills: 3 | Status: SHIPPED | OUTPATIENT
Start: 2024-10-24

## 2024-11-08 LAB
NON-UH HIE A/G RATIO: 1
NON-UH HIE ALB: 3.7 G/DL (ref 3.4–5)
NON-UH HIE ALK PHOS: 83 UNIT/L (ref 45–117)
NON-UH HIE BILIRUBIN, TOTAL: 0.4 MG/DL (ref 0.3–1.2)
NON-UH HIE BUN/CREAT RATIO: 12.3
NON-UH HIE BUN: 32 MG/DL (ref 9–23)
NON-UH HIE CALCIUM: 10.5 MG/DL (ref 8.7–10.4)
NON-UH HIE CALCULATED LDL CHOLESTEROL: 112 MG/DL (ref 60–130)
NON-UH HIE CALCULATED OSMOLALITY: 287 MOSM/KG (ref 275–295)
NON-UH HIE CHLORIDE: 107 MMOL/L (ref 98–107)
NON-UH HIE CHOLESTEROL: 196 MG/DL (ref 100–200)
NON-UH HIE CO2, VENOUS: 25 MMOL/L (ref 20–31)
NON-UH HIE CREATININE: 2.6 MG/DL (ref 0.6–1.1)
NON-UH HIE GFR AA: 29
NON-UH HIE GLOBULIN: 3.8 G/DL
NON-UH HIE GLOMERULAR FILTRATION RATE: 24 ML/MIN/1.73M?
NON-UH HIE GLUCOSE: 115 MG/DL (ref 74–106)
NON-UH HIE GOT: 21 UNIT/L (ref 15–37)
NON-UH HIE GPT: 16 UNIT/L (ref 10–49)
NON-UH HIE HDL CHOLESTEROL: 39 MG/DL (ref 40–60)
NON-UH HIE K: 5.2 MMOL/L (ref 3.5–5.1)
NON-UH HIE NA: 140 MMOL/L (ref 135–145)
NON-UH HIE TOTAL CHOL/HDL CHOL RATIO: 5
NON-UH HIE TOTAL PROTEIN: 7.5 G/DL (ref 5.7–8.2)
NON-UH HIE TRIGLYCERIDES: 223 MG/DL (ref 30–150)

## 2024-11-19 ENCOUNTER — APPOINTMENT (OUTPATIENT)
Dept: PRIMARY CARE | Facility: CLINIC | Age: 84
End: 2024-11-19
Payer: MEDICARE

## 2024-12-03 ENCOUNTER — APPOINTMENT (OUTPATIENT)
Dept: PRIMARY CARE | Facility: CLINIC | Age: 84
End: 2024-12-03
Payer: MEDICARE

## 2024-12-16 ENCOUNTER — APPOINTMENT (OUTPATIENT)
Dept: PRIMARY CARE | Facility: CLINIC | Age: 84
End: 2024-12-16
Payer: MEDICARE

## 2024-12-16 PROBLEM — N28.9 ACUTE RENAL INSUFFICIENCY: Status: RESOLVED | Noted: 2023-09-23 | Resolved: 2024-12-16

## 2024-12-16 PROBLEM — K92.2 GASTROINTESTINAL HEMORRHAGE: Status: RESOLVED | Noted: 2023-09-23 | Resolved: 2024-12-16

## 2024-12-16 PROBLEM — K63.89 MELANOSIS COLI: Status: RESOLVED | Noted: 2023-09-23 | Resolved: 2024-12-16

## 2025-01-08 ENCOUNTER — APPOINTMENT (OUTPATIENT)
Dept: PRIMARY CARE | Facility: CLINIC | Age: 85
End: 2025-01-08
Payer: MEDICARE

## 2025-01-14 DIAGNOSIS — N40.0 BENIGN PROSTATIC HYPERPLASIA, UNSPECIFIED WHETHER LOWER URINARY TRACT SYMPTOMS PRESENT: ICD-10-CM

## 2025-01-14 RX ORDER — DOXAZOSIN 1 MG/1
1 TABLET ORAL DAILY
Qty: 90 TABLET | Refills: 1 | Status: SHIPPED | OUTPATIENT
Start: 2025-01-14

## 2025-04-28 ENCOUNTER — PATIENT OUTREACH (OUTPATIENT)
Dept: PRIMARY CARE | Facility: CLINIC | Age: 85
End: 2025-04-28
Payer: MEDICARE

## 2025-04-28 NOTE — PROGRESS NOTES
Discharge Facility:  [facility]    Discharge Diagnosis:  Fall  Generalized weakness  Rhabdomyolysis  MANDEEP on CKD, improved     Admission Date:  4/25/25  Discharge Date:   4/27/25    PCP Appointment Date:  TBD-I am unable to make an appt due to no openings . Message sent to office staff requesting assistance. As well as encourged patient/wife to call to schedule if they don't hear from office.     Specialist Appointment Date:   TBD  Hospital Encounter and Summary Linked: Yes  Apr 25  Unknown with TERE BYRNE; EDUARD OLSEN; RUSSELL ALEJANDRA; EVONNE TOMAS   See discharge assessment below for further details    Wrap Up  Wrap Up Additional Comments: Successful transition of care outreach within 2 business days of discharge. CM introduced myself and the TCM program.   CM gave my contact information and encouraged to call if needing assistance or has any further non-emergent questions prior to my next outreach.   Reviewed hospital stay and answered any questions. Reviewed : check blood pressure daily in the morning, keep a log and show to PCP . Educated on proper BP taking techniques. Patient & Wife denies any further discharge questions/needs at this time. (4/28/2025  1:06 PM)    Engagement  Call Start Time: -- (Spoke with Sherman and WIfe) (4/28/2025  1:06 PM)    Medications  Medications reviewed with patient/caregiver?: Yes (4/28/2025  1:06 PM)  Is the patient having any side effects they believe may be caused by any medication additions or changes?: No (4/28/2025  1:06 PM)  Does the patient have all medications ordered at discharge?: Yes (4/28/2025  1:06 PM)  Care Management Interventions: Provided patient education (4/28/2025  1:06 PM)  Prescription Comments: Changed : Norvasc 5 mg oral tablet 5 mg 1 tabs, ORAL, BID, 60 tabs, Date: 4/27/25 12:04:00 PM EDT, [id number],      traZODone 50 mg oral tablet 50 mg 1 tabs, ORAL, QHS, 30 tabs, Date: 4/27/25 12:05:00 PM EDT, [id number] HOLD: [facility] (4/28/2025  1:06 PM)  Is the patient taking all medications as directed (includes completed medication regime)?: Yes (4/28/2025  1:06 PM)  Care Management Interventions: Provided patient education (4/28/2025  1:06 PM)  Medication Comments: CM discussed referencing discharge paperwork to follow detailed daily medication schedule.  Endorses understanding & compliance with medications. Encouraged to reach out to office or CM with any medication questions in future. Reviewed that new RX sent to the pharmacy is to increase Norvasc to twice a day. Also the trazodine is listed as one at bedtime now and not 2 if needed . Patient is no longer taking Ambien, Adivsed to hold in hospital. (4/28/2025  1:06 PM)    Appointments  Does the patient have a primary care provider?: Yes (4/28/2025  1:06 PM)  Care Management Interventions: Advised patient to make appointment (4/28/2025  1:06 PM)  Has the patient kept scheduled appointments due by today?: Yes (4/28/2025  1:06 PM)    Self Management  What is the home health agency?: [facility] ([facility]) [phone] (4/28/2025  1:06 PM)  Has home health visited the patient within 72 hours of discharge?: No (discussed to reach out to homecare if not called by tomorrow) (4/28/2025  1:06 PM)  What Durable Medical Equipment (DME) was ordered?: na (4/28/2025  1:06 PM)    Patient Teaching  Does the patient have access to their discharge instructions?: Yes (4/28/2025  1:06 PM)  Care Management Interventions: Reviewed instructions with patient; Educated on MyChart (4/28/2025  1:06 PM)  What is the patient's perception of their health status since discharge?: Improving (4/28/2025  1:06 PM)  Is the patient/caregiver able to teach back the hierarchy of who to call/visit for symptoms/problems? PCP, Specialist, Home Health nurse, Urgent Care, ED, 911: Yes (4/28/2025  1:06 PM)

## 2025-05-01 ENCOUNTER — APPOINTMENT (OUTPATIENT)
Dept: PRIMARY CARE | Facility: CLINIC | Age: 85
End: 2025-05-01
Payer: MEDICARE

## 2025-05-01 NOTE — PROGRESS NOTES
No chief complaint on file.      83 year old man   last visit [id number]. Accompanied by his wife.   Saw Dr Garcia-  Admission 04/2025 fall, rhabdo, MANDEEP SW    PAST MEDICAL HISTORY:   1. CABG 2010.  2. Hypothyroidism, TSH 4/2024 normal  3. Hyperglycemia.  4. Anemia, admission 2014.  5. GERD, hiatal hernia, esophageal stricture, status post dilatation.  6. Gastric ulcer and duodenitis.  7. Hyperlipidemia.  8. Gout.  9. Diverticulosis.  10. Depression and anxiety.  11. Insomnia.  12. Erectile dysfunction.  13. Prior leukopenia.  14. Thrombocytopenia in 2016, previously evaluated by Dr. Moralez.  15. LEFT 3rd toe chronic ulcer. Dr Dominguez. Healed 2019  16. Cystitis, ARF, sepsis. possible bladder mass. 7/2022.     PHYSICAL EXAM:   There were no vitals taken for this visit.  There is no height or weight on file to calculate BMI.  Chest well healed surgical scar     Vital reviewed  Neck: no cervical/clavicular adenopathy  CV: RRR S1 S2 normal. No murmur. No carotid bruit.   Lungs: +dry crackles. . Breath sounds symmetric  Abdomen: normoactive. Soft, nontender. No mass.  Extremities: no pretibial edema  Neuro: speech intact.      4/2024  A1c, lipid, tsh, cmp, cbc.  A1c 5.2  149/37/70/211       TSH 1.82 normal        K 5.2 calcium elevated 10.7 cr 2.5 glucose 109       Wbc 5.5 hg 13.2 platelet 157    4/2023 K, cbc, cmp tsh  K 4.8 repeat 4.8 (post kayexolate)   hg 12 hct 36.6 cr 2.4 Na 140        12/2022 A1c, CBC, iron, ferritin, CMP, lipid  A1c 5.1  White blood cell count 5.7 hemoglobin low 11.7 hematocrit 35.2 platelet 162 iron 70 ferritin 190 normal  Potassium 5.2 elevated. Creatinine 2.0 BUN 41 glucose elevated 109 liver function test negative  Cholesterol 111 HDL low 36 LDL 42 triglyceride 165     8/24/22 bmp  Na 141 K 4.7 Cr 2.2 Glucose 117 GFR 28     8/2022 A1c, BNP, liid, cmp, tsh, cbc  A1c 4.3 wbc 6.9 hg 10.2 hct 31.4  BNP elevated 493 (-100)  167/81/58/138  Na 141 Cr 2.2 K elevated 5.6-- corrected. GFR 29  TSH elevated  4.15     Assessment and plan  1. Hypertension, unspecified type  Not at goal. Asymptomatic. Restart amlodipine 5 mg  They will let Dr Garcia know.   Has orders for labs in November from Dr Garcia for renal panel., MA, REZA, cbc, ferritin, and iron  F/up here 6 months and prn.   - amLODIPine (Norvasc) 5 mg tablet; Take 1 tablet (5 mg) by mouth once daily.  Dispense: 90 tablet; Refill: 3      colonoscopy 2015 Dr Naylor     Current Outpatient Medications on File Prior to Visit   Medication Sig Dispense Refill    amLODIPine (Norvasc) 5 mg tablet Take 1 tablet (5 mg) by mouth once daily. 90 tablet 3    ascorbic acid (Strawberry C) 500 mg chewable tablet Chew 1 tablet (500 mg) see administration instructions. Take as directed      aspirin 81 mg EC tablet Take 1 tablet (81 mg) by mouth once daily.      BACILLUS SUBTILIS-INULIN ORAL Take 1 tablet by mouth once daily.      chlorthalidone (Hygroton) 25 mg tablet Take 1 tablet (25 mg) by mouth early in the morning..      cholecalciferol (Vitamin D-3) 125 MCG (5000 UT) capsule Take 1 capsule (125 mcg) by mouth once daily.      diphenhydrAMINE-acetaminophen (Tylenol PM)  mg per tablet Take 1 tablet by mouth as needed at bedtime for sleep.      doxazosin (Cardura) 1 mg tablet TAKE 1 TABLET BY MOUTH DAILY 90 tablet 1    ferrous sulfate, 325 mg ferrous sulfate, tablet Take 1 tablet by mouth once daily with breakfast. Take with otc vitamin c 90 tablet 3    levothyroxine (Synthroid, Levoxyl) 88 mcg tablet TAKE 1 TABLET(88 MCG) BY MOUTH DAILY 90 tablet 3    melatonin 3 mg tablet Take 1-2 tablets (3-6 mg) by mouth as needed at bedtime.      metoprolol tartrate (Lopressor) 50 mg tablet TAKE 1 TABLET BY MOUTH TWICE DAILY 180 tablet 3    multivit-min/ferrous fumarate (MULTI VITAMIN ORAL) Take 1 tablet by mouth once daily.      omeprazole (PriLOSEC) 40 mg DR capsule TAKE 1 CAPSULE BY MOUTH EVERY DAY IN THE MORNING AND 60 MINUTES BEFORE A MEAL 180 capsule 3    rosuvastatin (Crestor) 40  mg tablet TAKE 1 TABLET(40 MG) BY MOUTH EVERY DAY 90 tablet 3    sertraline (Zoloft) 50 mg tablet TAKE 1 TABLET(50 MG) BY MOUTH EVERY DAY 90 tablet 3    tadalafil (Cialis) 20 mg tablet Take 1 tablet (20 mg) by mouth once daily as needed for erectile dysfunction. 12 tablet 3    traZODone (Desyrel) 50 mg tablet Take 1 tablet (50 mg) by mouth as needed at bedtime for sleep. May increase to 2 tablets at bedtime if needed 180 tablet 3     No current facility-administered medications on file prior to visit.

## 2025-05-08 ENCOUNTER — APPOINTMENT (OUTPATIENT)
Dept: PRIMARY CARE | Facility: CLINIC | Age: 85
End: 2025-05-08
Payer: MEDICARE

## 2025-05-09 ENCOUNTER — PATIENT OUTREACH (OUTPATIENT)
Dept: PRIMARY CARE | Facility: CLINIC | Age: 85
End: 2025-05-09
Payer: MEDICARE

## 2025-05-09 NOTE — PROGRESS NOTES
Unable to reach patient for follow up call after recent hospitalization.   Left voicemail with call back number for patient to call if needed to assist with any questions or concerns patient may have.    Encouraged to keep upcoming appt :  Appointment with Tianna Balbuena MD (05/16/2025)

## 2025-05-10 DIAGNOSIS — E78.5 HYPERLIPIDEMIA, UNSPECIFIED HYPERLIPIDEMIA TYPE: ICD-10-CM

## 2025-05-10 DIAGNOSIS — I10 PRIMARY HYPERTENSION: ICD-10-CM

## 2025-05-10 DIAGNOSIS — E03.9 HYPOTHYROIDISM, UNSPECIFIED TYPE: ICD-10-CM

## 2025-05-12 RX ORDER — METOPROLOL TARTRATE 50 MG/1
50 TABLET ORAL 2 TIMES DAILY
Qty: 180 TABLET | Refills: 3 | Status: SHIPPED | OUTPATIENT
Start: 2025-05-12

## 2025-05-12 RX ORDER — ROSUVASTATIN CALCIUM 40 MG/1
40 TABLET, COATED ORAL DAILY
Qty: 90 TABLET | Refills: 3 | Status: SHIPPED | OUTPATIENT
Start: 2025-05-12

## 2025-05-12 RX ORDER — LEVOTHYROXINE SODIUM 88 UG/1
88 TABLET ORAL DAILY
Qty: 90 TABLET | Refills: 3 | Status: SHIPPED | OUTPATIENT
Start: 2025-05-12

## 2025-05-16 ENCOUNTER — APPOINTMENT (OUTPATIENT)
Dept: PRIMARY CARE | Facility: CLINIC | Age: 85
End: 2025-05-16
Payer: MEDICARE

## 2025-06-08 DIAGNOSIS — G47.00 INSOMNIA, UNSPECIFIED TYPE: ICD-10-CM

## 2025-06-09 ENCOUNTER — PATIENT OUTREACH (OUTPATIENT)
Dept: PRIMARY CARE | Facility: CLINIC | Age: 85
End: 2025-06-09
Payer: MEDICARE

## 2025-06-09 RX ORDER — TRAZODONE HYDROCHLORIDE 50 MG/1
TABLET ORAL
Qty: 180 TABLET | Refills: 3 | Status: SHIPPED | OUTPATIENT
Start: 2025-06-09

## 2025-06-18 ENCOUNTER — TELEPHONE (OUTPATIENT)
Dept: PRIMARY CARE | Facility: CLINIC | Age: 85
End: 2025-06-18
Payer: MEDICARE

## 2025-06-18 LAB
NON-UH HIE HEPATITIS B SURFACE ANTIGEN: NORMAL
NON-UH HIE HEPATITIS C ANTIBODY: NORMAL
NON-UH HIE HIV  PANEL 1: NORMAL

## 2025-06-18 NOTE — TELEPHONE ENCOUNTER
Spoke with wife. Pt admitted severe constipation. She kept trying to convince him to go to the hospital but he did not want to go. He then came in and was admitted. He was doing better  and ate a little bit in the hospital. However,  during the night he arrested. Required CPR and intubated. Renal failure and liver tests. Troponin elevated. Lactic acid elevated on admission. They indicated they think is terminal.   They talked about not doing CPR if he arrested again. She decided not to do CPR if he arrested again.  Dr Garcia is following from renal standpoint.  She will see how he does overnight.

## 2025-06-19 ENCOUNTER — TELEPHONE (OUTPATIENT)
Dept: PRIMARY CARE | Facility: CLINIC | Age: 85
End: 2025-06-19

## 2025-06-19 ENCOUNTER — APPOINTMENT (OUTPATIENT)
Dept: PRIMARY CARE | Facility: CLINIC | Age: 85
End: 2025-06-19
Payer: MEDICARE

## 2025-06-19 NOTE — TELEPHONE ENCOUNTER
Spoke with wife. He is still intubated. Kidney function has worsened. Slightly decrease in liver function tests.   Called wife back. Reviewed labs. They are planning MRI brain tomorrow.

## 2025-06-20 ENCOUNTER — TELEPHONE (OUTPATIENT)
Dept: PRIMARY CARE | Facility: CLINIC | Age: 85
End: 2025-06-20
Payer: MEDICARE

## 2025-06-20 NOTE — TELEPHONE ENCOUNTER
Spoke with Dr Reyes and Dr Nolan this am (did not have MRI results at this time)   Called and spoke with wife. 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 Did not think he would be aware if he could recover. Planning to withdraw from vent and keep him comfortable.

## 2025-06-23 DIAGNOSIS — I10 HYPERTENSION, UNSPECIFIED TYPE: ICD-10-CM

## 2025-06-23 RX ORDER — AMLODIPINE BESYLATE 5 MG/1
TABLET ORAL
Qty: 90 TABLET | Refills: 3 | OUTPATIENT
Start: 2025-06-23

## 2025-07-17 DIAGNOSIS — N40.0 BENIGN PROSTATIC HYPERPLASIA, UNSPECIFIED WHETHER LOWER URINARY TRACT SYMPTOMS PRESENT: ICD-10-CM

## 2025-07-17 RX ORDER — DOXAZOSIN 1 MG/1
1 TABLET ORAL DAILY
Qty: 90 TABLET | Refills: 1 | OUTPATIENT
Start: 2025-07-17